# Patient Record
Sex: FEMALE | Race: ASIAN | NOT HISPANIC OR LATINO
[De-identification: names, ages, dates, MRNs, and addresses within clinical notes are randomized per-mention and may not be internally consistent; named-entity substitution may affect disease eponyms.]

---

## 2018-10-29 PROBLEM — Z00.00 ENCOUNTER FOR PREVENTIVE HEALTH EXAMINATION: Status: ACTIVE | Noted: 2018-10-29

## 2018-11-05 ENCOUNTER — APPOINTMENT (OUTPATIENT)
Dept: THORACIC SURGERY | Facility: CLINIC | Age: 78
End: 2018-11-05
Payer: MEDICARE

## 2018-11-05 VITALS
WEIGHT: 141 LBS | OXYGEN SATURATION: 98 % | HEART RATE: 82 BPM | DIASTOLIC BLOOD PRESSURE: 75 MMHG | BODY MASS INDEX: 25.3 KG/M2 | SYSTOLIC BLOOD PRESSURE: 166 MMHG | HEIGHT: 62.5 IN | TEMPERATURE: 97.8 F

## 2018-11-05 DIAGNOSIS — Z86.39 PERSONAL HISTORY OF OTHER ENDOCRINE, NUTRITIONAL AND METABOLIC DISEASE: ICD-10-CM

## 2018-11-05 DIAGNOSIS — Z86.79 PERSONAL HISTORY OF OTHER DISEASES OF THE CIRCULATORY SYSTEM: ICD-10-CM

## 2018-11-05 DIAGNOSIS — J30.2 OTHER SEASONAL ALLERGIC RHINITIS: ICD-10-CM

## 2018-11-05 DIAGNOSIS — Z86.69 PERSONAL HISTORY OF OTHER DISEASES OF THE NERVOUS SYSTEM AND SENSE ORGANS: ICD-10-CM

## 2018-11-05 DIAGNOSIS — K21.9 GASTRO-ESOPHAGEAL REFLUX DISEASE W/OUT ESOPHAGITIS: ICD-10-CM

## 2018-11-05 PROCEDURE — 99205 OFFICE O/P NEW HI 60 MIN: CPT

## 2018-11-07 PROBLEM — J30.2 SEASONAL ALLERGIES: Status: RESOLVED | Noted: 2018-11-07 | Resolved: 2018-11-07

## 2018-11-07 PROBLEM — Z86.79 HISTORY OF CARDIAC MURMUR: Status: RESOLVED | Noted: 2018-11-07 | Resolved: 2018-11-07

## 2018-11-07 PROBLEM — Z86.69 HISTORY OF TRIGEMINAL NEURALGIA: Status: RESOLVED | Noted: 2018-11-07 | Resolved: 2018-11-07

## 2018-11-07 PROBLEM — Z86.39 HISTORY OF HYPERLIPIDEMIA: Status: RESOLVED | Noted: 2018-11-07 | Resolved: 2018-11-07

## 2018-11-07 PROBLEM — K21.9 CHRONIC GERD: Status: RESOLVED | Noted: 2018-11-07 | Resolved: 2018-11-07

## 2018-11-07 RX ORDER — CALCIUM CARBONATE/VITAMIN D3 600MG-5MCG
TABLET ORAL
Refills: 0 | Status: ACTIVE | COMMUNITY

## 2018-12-10 ENCOUNTER — APPOINTMENT (OUTPATIENT)
Dept: THORACIC SURGERY | Facility: CLINIC | Age: 78
End: 2018-12-10
Payer: MEDICARE

## 2018-12-10 VITALS
BODY MASS INDEX: 25.12 KG/M2 | HEART RATE: 82 BPM | WEIGHT: 140 LBS | HEIGHT: 62.5 IN | TEMPERATURE: 98.7 F | OXYGEN SATURATION: 96 % | DIASTOLIC BLOOD PRESSURE: 79 MMHG | SYSTOLIC BLOOD PRESSURE: 145 MMHG

## 2018-12-10 PROCEDURE — 99214 OFFICE O/P EST MOD 30 MIN: CPT

## 2019-04-01 ENCOUNTER — RECORD ABSTRACTING (OUTPATIENT)
Age: 79
End: 2019-04-01

## 2019-04-01 DIAGNOSIS — Z83.6 FAMILY HISTORY OF OTHER DISEASES OF THE RESPIRATORY SYSTEM: ICD-10-CM

## 2019-04-01 DIAGNOSIS — Z83.3 FAMILY HISTORY OF DIABETES MELLITUS: ICD-10-CM

## 2019-09-12 ENCOUNTER — APPOINTMENT (OUTPATIENT)
Dept: RHEUMATOLOGY | Facility: CLINIC | Age: 79
End: 2019-09-12

## 2019-09-27 ENCOUNTER — APPOINTMENT (OUTPATIENT)
Dept: RHEUMATOLOGY | Facility: CLINIC | Age: 79
End: 2019-09-27
Payer: MEDICARE

## 2019-09-27 VITALS
BODY MASS INDEX: 25.38 KG/M2 | DIASTOLIC BLOOD PRESSURE: 70 MMHG | HEART RATE: 67 BPM | WEIGHT: 141 LBS | OXYGEN SATURATION: 98 % | SYSTOLIC BLOOD PRESSURE: 140 MMHG

## 2019-09-27 PROCEDURE — 99214 OFFICE O/P EST MOD 30 MIN: CPT

## 2019-09-27 RX ORDER — OXCARBAZEPINE 150 MG/1
150 TABLET, FILM COATED ORAL
Refills: 0 | Status: DISCONTINUED | COMMUNITY
End: 2019-09-27

## 2019-09-27 RX ORDER — HYDROCODONE BITARTRATE AND HOMATROPINE METHYLBROMIDE 5; 1.5 MG/5ML; MG/5ML
SOLUTION ORAL
Refills: 0 | Status: DISCONTINUED | COMMUNITY
End: 2019-09-27

## 2019-09-27 RX ORDER — ASPIRIN ENTERIC COATED TABLETS 81 MG 81 MG/1
81 TABLET, DELAYED RELEASE ORAL DAILY
Refills: 0 | Status: DISCONTINUED | COMMUNITY
End: 2019-09-27

## 2019-09-27 RX ORDER — HYDROCODONE BITARTRATE AND HOMATROPINE METHYLBROMIDE 5; 1.5 MG/5ML; MG/5ML
SOLUTION ORAL
Refills: 0 | Status: ACTIVE | COMMUNITY

## 2019-09-27 RX ORDER — OMEPRAZOLE MAGNESIUM 20 MG/1
20 TABLET, DELAYED RELEASE ORAL
Refills: 0 | Status: DISCONTINUED | COMMUNITY
End: 2019-09-27

## 2019-09-27 RX ORDER — ACETAMINOPHEN 325 MG/1
TABLET, FILM COATED ORAL
Refills: 0 | Status: DISCONTINUED | COMMUNITY
End: 2019-09-27

## 2019-09-27 RX ORDER — OXCARBAZEPINE 150 MG/1
150 TABLET, FILM COATED ORAL
Refills: 0 | Status: ACTIVE | COMMUNITY

## 2019-09-27 RX ORDER — DESLORATADINE 5 MG/1
5 TABLET, FILM COATED ORAL DAILY
Refills: 0 | Status: DISCONTINUED | COMMUNITY
End: 2019-09-27

## 2019-09-27 NOTE — HISTORY OF PRESENT ILLNESS
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 More recently was switched to pravastatin, but patient reports muscle symptoms have improved but not resolved. She takes meloxicam for relief of muscle symptoms with marginal benefit per patient. There has been no progression of symptoms related to TEREZA positivity. There is no significant AM stiffness (only lasting 5-10 minutes), rash, fever, symptoms of serositis or other symptoms. The only peripheral joint pain she notes if in the first CMC joints bilaterally.  Also since last visit she has been found to have a cystic mediastinal mass, evaluated and followed by Dr. Cottrell.

## 2019-09-27 NOTE — REVIEW OF SYSTEMS
[Joint Pain] : joint pain [Fever] : no fever [Chills] : no chills [Eye Pain] : no eye pain [Shortness Of Breath] : no shortness of breath [Red Eyes] : eyes not red [Cough] : no cough [Abdominal Pain] : no abdominal pain [Diarrhea] : no diarrhea [Joint Swelling] : no joint swelling [Joint Stiffness] : no joint stiffness [Skin Lesions] : no skin lesions [FreeTextEntry6] : No pleuritic C/P

## 2019-09-27 NOTE — PHYSICAL EXAM
[General Appearance - Alert] : alert [General Appearance - In No Acute Distress] : in no acute distress [General Appearance - Well Nourished] : well nourished [General Appearance - Well Developed] : well developed [Sclera] : the sclera and conjunctiva were normal [Cervical Lymph Nodes Enlarged Posterior Bilaterally] : posterior cervical [Auscultation Breath Sounds / Voice Sounds] : lungs were clear to auscultation bilaterally [Cervical Lymph Nodes Enlarged Anterior Bilaterally] : anterior cervical [] : no rash [Motor Exam] : the motor exam was normal [FreeTextEntry1] : Neck flexors and extensors are normal without evidence of weakness.

## 2019-10-01 LAB — CK SERPL-CCNC: 115 U/L

## 2019-10-02 LAB — ALDOLASE SERPL-CCNC: 5.2 U/L

## 2019-10-14 LAB
EJ AB SER QL: NEGATIVE
ENA JO1 AB SER IA-ACNC: <20 UNITS
ENA PM/SCL AB SER-ACNC: <20 UNITS
ENA SM+RNP AB SER IA-ACNC: <20 UNITS
ENA SS-A IGG SER QL: <20 UNITS
FIBRILLARIN AB SER QL: NEGATIVE
KU AB SER QL: NEGATIVE
MDA-5 (P140)(CADM-140): <20 UNITS
MI2 AB SER QL: NEGATIVE
NXP-2 (P140): <20 UNITS
OJ AB SER QL: NEGATIVE
PL12 AB SER QL: NEGATIVE
PL7 AB SER QL: NEGATIVE
SRP AB SERPL QL: NEGATIVE
TIF GAMMA (P155/140): <20 UNITS
U2 SNRNP AB SER QL: NEGATIVE

## 2019-11-05 ENCOUNTER — RESULT REVIEW (OUTPATIENT)
Age: 79
End: 2019-11-05

## 2019-11-06 ENCOUNTER — APPOINTMENT (OUTPATIENT)
Dept: GASTROENTEROLOGY | Facility: HOSPITAL | Age: 79
End: 2019-11-06

## 2020-09-21 ENCOUNTER — APPOINTMENT (OUTPATIENT)
Dept: RHEUMATOLOGY | Facility: CLINIC | Age: 80
End: 2020-09-21
Payer: MEDICARE

## 2020-09-21 VITALS
HEIGHT: 62.5 IN | WEIGHT: 140 LBS | SYSTOLIC BLOOD PRESSURE: 148 MMHG | DIASTOLIC BLOOD PRESSURE: 80 MMHG | BODY MASS INDEX: 25.12 KG/M2

## 2020-09-21 DIAGNOSIS — R76.8 OTHER SPECIFIED ABNORMAL IMMUNOLOGICAL FINDINGS IN SERUM: ICD-10-CM

## 2020-09-21 DIAGNOSIS — Z86.03 PERSONAL HISTORY OF NEOPLASM OF UNCERTAIN BEHAVIOR: ICD-10-CM

## 2020-09-21 DIAGNOSIS — E27.8 OTHER SPECIFIED DISORDERS OF ADRENAL GLAND: ICD-10-CM

## 2020-09-21 DIAGNOSIS — T46.6X5A MYALGIA, UNSPECIFIED SITE: ICD-10-CM

## 2020-09-21 DIAGNOSIS — R70.0 ELEVATED ERYTHROCYTE SEDIMENTATION RATE: ICD-10-CM

## 2020-09-21 DIAGNOSIS — E55.9 VITAMIN D DEFICIENCY, UNSPECIFIED: ICD-10-CM

## 2020-09-21 DIAGNOSIS — M79.10 MYALGIA, UNSPECIFIED SITE: ICD-10-CM

## 2020-09-21 PROCEDURE — 99214 OFFICE O/P EST MOD 30 MIN: CPT

## 2020-09-21 RX ORDER — MONTELUKAST SODIUM 10 MG/1
10 TABLET, FILM COATED ORAL
Refills: 0 | Status: DISCONTINUED | COMMUNITY
End: 2020-09-21

## 2020-09-21 RX ORDER — ALBUTEROL SULFATE 108 UG/1
108 (90 BASE) AEROSOL, METERED RESPIRATORY (INHALATION) EVERY 4 HOURS
Refills: 0 | Status: DISCONTINUED | COMMUNITY
End: 2020-09-21

## 2020-09-21 RX ORDER — HALOBETASOL PROPIONATE 0.5 MG/G
0.05 OINTMENT TOPICAL
Refills: 0 | Status: DISCONTINUED | COMMUNITY
End: 2020-09-21

## 2020-09-21 NOTE — HISTORY OF PRESENT ILLNESS
[FreeTextEntry1] : Patient reports no progression of symptoms since last visit one year ago. No fever, rash or skin photosensitivity, serositis, joint pain or stiffness, or Raynaud's.

## 2021-10-04 ENCOUNTER — APPOINTMENT (OUTPATIENT)
Dept: GASTROENTEROLOGY | Facility: CLINIC | Age: 81
End: 2021-10-04
Payer: MEDICARE

## 2021-10-04 ENCOUNTER — NON-APPOINTMENT (OUTPATIENT)
Age: 81
End: 2021-10-04

## 2021-10-04 VITALS
SYSTOLIC BLOOD PRESSURE: 138 MMHG | DIASTOLIC BLOOD PRESSURE: 82 MMHG | BODY MASS INDEX: 25.48 KG/M2 | TEMPERATURE: 97 F | HEIGHT: 62.5 IN | WEIGHT: 142 LBS | HEART RATE: 72 BPM

## 2021-10-04 PROCEDURE — 99213 OFFICE O/P EST LOW 20 MIN: CPT

## 2021-10-04 RX ORDER — METOPROLOL TARTRATE 25 MG/1
25 TABLET, FILM COATED ORAL
Refills: 0 | Status: ACTIVE | COMMUNITY

## 2021-10-04 RX ORDER — DESLORATADINE 5 MG/1
5 TABLET, FILM COATED ORAL
Refills: 0 | Status: DISCONTINUED | COMMUNITY
End: 2021-10-04

## 2021-10-04 RX ORDER — NAPHAZOLINE HCL/PHENIRAMINE .0268-.315
0.1-0.3 DROPS OPHTHALMIC (EYE)
Refills: 0 | Status: ACTIVE | COMMUNITY

## 2021-10-04 RX ORDER — PRAVASTATIN SODIUM 20 MG/1
20 TABLET ORAL
Refills: 0 | Status: ACTIVE | COMMUNITY

## 2021-10-04 RX ORDER — ELECTROLYTES/DEXTROSE
SOLUTION, ORAL ORAL
Refills: 0 | Status: ACTIVE | COMMUNITY

## 2021-10-04 RX ORDER — CHROMIUM 200 MCG
400 TABLET ORAL
Refills: 0 | Status: ACTIVE | COMMUNITY

## 2021-10-04 RX ORDER — ACETAMINOPHEN 500 MG/1
500 TABLET, COATED ORAL
Refills: 0 | Status: ACTIVE | COMMUNITY

## 2021-10-04 RX ORDER — FAMOTIDINE 20 MG/1
20 TABLET, FILM COATED ORAL
Refills: 0 | Status: ACTIVE | COMMUNITY

## 2021-10-04 RX ORDER — LEVALBUTEROL TARTRATE 45 UG/1
45 AEROSOL, METERED ORAL
Refills: 0 | Status: ACTIVE | COMMUNITY

## 2021-10-04 RX ORDER — SENNOSIDES 8.6 MG
8.6 TABLET ORAL
Refills: 0 | Status: ACTIVE | COMMUNITY

## 2021-10-06 NOTE — HISTORY OF PRESENT ILLNESS
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.\par \par She had a first colonoscopy in 2018, and a normal colonoscopy eight years before that\par \par there was in the cecum, a very flat polyp, encircling the appendiceal orifice, which on biopsy proved to be an infiltrative lesion,  and she had a right hemicolectomy at [facility].\par \par there had also been a sub cm tubular adenoma of the colon.\par \par a fu colonoscopy was performed in November, 2019, and was negative\par \par we tentatively decided on a two year fu colonoscopy\par \par other issues;\par aortic stenosis, followed by Dr Jensen, last seen two months ago, scheduled for fu late March, did have some shortness of breath a few months ago, but it was transient, and Sr does not take any cardiac meds.\par \par the does have abx prophylaxis for dental visits, but not for colonoscopy

## 2021-10-06 NOTE — ASSESSMENT
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.\par \par she is doing well GI wise, and we felt she could safely be done after her upcoming trip, exhibition, in [place] later in the year\par \par has achronic pain condition, chronic right lumbo sacral pain, managed by Dr Perez, and an MRI is scheduled..\par \par had been on several pain meds, including MObic, and then Ibuprofen...has been present with right lumbo sacral pain for seven years\par \par Sister is taking famotidine 20 mg bid for protection of stomach, and also, just recently, has gotten off the antiinflammatories\par \par i emphasized that this is best because of the propensity of NSAIDS to produce gastric ulceration...;  this includes MObic, and this includes Ibuprofen;  and I would advise that if Sister needs to go back on an NSAID< that she use Omeprazole a PPI medication with better cytoprotection of the stomach going forward

## 2022-04-04 ENCOUNTER — APPOINTMENT (OUTPATIENT)
Dept: GASTROENTEROLOGY | Facility: CLINIC | Age: 82
End: 2022-04-04
Payer: MEDICARE

## 2022-04-04 VITALS
HEART RATE: 72 BPM | DIASTOLIC BLOOD PRESSURE: 72 MMHG | SYSTOLIC BLOOD PRESSURE: 128 MMHG | WEIGHT: 141 LBS | TEMPERATURE: 97.5 F | BODY MASS INDEX: 25.3 KG/M2 | HEIGHT: 62.5 IN

## 2022-04-04 DIAGNOSIS — D09.9 CARCINOMA IN SITU, UNSPECIFIED: ICD-10-CM

## 2022-04-04 DIAGNOSIS — K29.60 OTHER GASTRITIS W/OUT BLEEDING: ICD-10-CM

## 2022-04-04 DIAGNOSIS — T39.395A OTHER GASTRITIS W/OUT BLEEDING: ICD-10-CM

## 2022-04-04 PROCEDURE — 99214 OFFICE O/P EST MOD 30 MIN: CPT

## 2022-04-04 RX ORDER — IBUPROFEN 200 MG/1
200 TABLET, COATED ORAL
Refills: 0 | Status: DISCONTINUED | COMMUNITY
End: 2022-04-04

## 2022-04-04 RX ORDER — MELOXICAM 7.5 MG/1
7.5 TABLET ORAL
Refills: 0 | Status: DISCONTINUED | COMMUNITY
End: 2022-04-04

## 2022-04-06 PROBLEM — K29.60 NSAID INDUCED GASTRITIS: Status: ACTIVE | Noted: 2021-10-04

## 2022-04-06 PROBLEM — D09.9: Status: ACTIVE | Noted: 2021-10-04

## 2022-04-06 NOTE — HISTORY OF PRESENT ILLNESS
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 discuss the timing for her next colonoscopy\par \par originally, Sr had a Colonoscopy back in 2018, a relatively innocous polyp which encircled the appendiceal orifice, actually even seemed to originate in the appendiceal oricie\par was noted\par \par piecemeal polypectomy done;  showing adenoca or at least high grade dysplasia in the polyp\par therefore, we decided to perform a right hemicolectomy, which was done...\par \par \par sucessful surgery in 2018\par \par had a normal colonoscopy fu in 2019, NOvember\par \par Sister is in good health\par no cv disease\par no angina, chest pain, pulmonary symptoms, or upper gi symptoms

## 2022-04-06 NOTE — ASSESSMENT
[FreeTextEntry1] : after a long discussion, we have decided that November, 2022 as a three year fu is the best time to do the next colonoscopy\par as the risk of anastamotic recurrent disease, from the oringinal patholgy, is close to zero\par so really we will just be looking for recurrent new polyps\par \par will have SIster come in or perhaps, even a telehealth or telephonic\par the visit will be in OCtober \par \par no indication for upper gi work up as there are no symptoms\par \par there will be no holds prior to the colonoscopy\par \par More than 50% of the face to face time was devoted to counseling and /or coordination of care.  THis coordination of care may have included reviewing other medical notes and reports, and communicating with other health professionals\par

## 2022-10-24 ENCOUNTER — APPOINTMENT (OUTPATIENT)
Dept: GASTROENTEROLOGY | Facility: CLINIC | Age: 82
End: 2022-10-24

## 2022-10-24 VITALS
HEIGHT: 62.5 IN | WEIGHT: 141 LBS | DIASTOLIC BLOOD PRESSURE: 60 MMHG | HEART RATE: 74 BPM | OXYGEN SATURATION: 98 % | SYSTOLIC BLOOD PRESSURE: 120 MMHG | BODY MASS INDEX: 25.3 KG/M2 | TEMPERATURE: 97.5 F

## 2022-10-24 PROCEDURE — 99213 OFFICE O/P EST LOW 20 MIN: CPT

## 2022-10-24 RX ORDER — GABAPENTIN 300 MG/1
300 CAPSULE ORAL
Refills: 0 | Status: DISCONTINUED | COMMUNITY
End: 2022-10-24

## 2022-10-24 RX ORDER — MONTELUKAST SODIUM 10 MG/1
10 TABLET, FILM COATED ORAL
Refills: 0 | Status: DISCONTINUED | COMMUNITY
End: 2022-10-24

## 2022-10-24 RX ORDER — AMOXICILLIN 500 MG/1
500 TABLET, FILM COATED ORAL
Qty: 4 | Refills: 0 | Status: DISCONTINUED | COMMUNITY
End: 2022-10-24

## 2022-10-24 NOTE — ASSESSMENT
[FreeTextEntry1] : 1.  patient has hx of adenomatous polyp, roberth appendiceal with in situ carcinoma, status post right epifanio colectomy\par 2.  this would be a three year follow up for colonoscopy\par \par AS WE OBTAIN INFORMED CONSENT FOR COLONOSCOPY, UPPER ENDOSCOPY [EGD], OR BOTH PROCEDURES TOGETHER;\par \par As with all procedures, there are risks of which the patient should be aware\par \par 1.  Anesthesia; deep sedation with Propofol;  there is a small risk of aspiration and pulmonary infection.  The Anesthesiologist meets with the patient the morning of the procedure to discuss in more detail\par \par 2.  risk of bleeding; from removal of a polyp, or rarely, from biopsies, 1 % or less\par \par 3.  risk of injury or perforation of the colon or upper GI tract; one in a thousand or less,  from removing a polyp or from advancing the instrument\par \par \par will do in mid december\par Dr Perez will perhaps wean off prednisone, but she was always on a low dose of prednisone, less than five mg, so there is no concern re adrenal suppression for colonoscopy\par \par

## 2022-10-24 NOTE — HISTORY OF PRESENT ILLNESS
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\par hx of adenomatous polyp which was roberth appendiceal\par which contained in situ carcinoma\par \par or high grade dysplasia\par because of location..\par we elected to proceed with a right epifanio colectomy\par this was in late 2018\par \par had one fu colonoscopy about nov 2019\par \par and we advised the next colonoscopy be performed now\par \par no symptoms, normal bowel movements..\par \par had a rash in May,\par also low back pain\par has been on Prednisone and Gabapentin\par \par 3 mg of Prednisone has been her steady state dose of Prednisone \par sees Dr Perez\par \par a back injection appeared to help her, last month...\par would like to wean off Prednisone...\par \par \par \par

## 2022-12-19 ENCOUNTER — RESULT REVIEW (OUTPATIENT)
Age: 82
End: 2022-12-19

## 2022-12-20 ENCOUNTER — RESULT REVIEW (OUTPATIENT)
Age: 82
End: 2022-12-20

## 2022-12-21 ENCOUNTER — APPOINTMENT (OUTPATIENT)
Dept: GASTROENTEROLOGY | Facility: HOSPITAL | Age: 82
End: 2022-12-21

## 2022-12-21 ENCOUNTER — TRANSCRIPTION ENCOUNTER (OUTPATIENT)
Age: 82
End: 2022-12-21

## 2023-03-30 ENCOUNTER — APPOINTMENT (OUTPATIENT)
Dept: PULMONOLOGY | Facility: CLINIC | Age: 83
End: 2023-03-30
Payer: MEDICARE

## 2023-03-30 VITALS
HEART RATE: 87 BPM | BODY MASS INDEX: 25.48 KG/M2 | SYSTOLIC BLOOD PRESSURE: 150 MMHG | HEIGHT: 62.5 IN | OXYGEN SATURATION: 99 % | TEMPERATURE: 97.1 F | WEIGHT: 142 LBS | DIASTOLIC BLOOD PRESSURE: 90 MMHG

## 2023-03-30 PROCEDURE — 99204 OFFICE O/P NEW MOD 45 MIN: CPT

## 2023-03-30 NOTE — ASSESSMENT
[FreeTextEntry1] :   I reviewed the CAT scan of the chest.  There are minor micronodules and minor bronchiectasis in the right middle lobe and lingula.  They are unchanged from 2018.  This represents small airway disease possibly atypical mycobacterial infection.  However in view of the stability of the finding on CAT scan and the lack of symptoms of a cough I would not pursue any further diagnosis.  Patient will not be able to raise sputum for AFB testing.  In terms of her mild shortness of breath I would like to obtain complete pulmonary function testing.  Patient should have the results of her PET scan for evaluation of the mediastinal mass.  She should follow-up with thoracic surgery.  However I favor a benign diagnosis such as pericardial or bronchogenic cyst.

## 2023-03-30 NOTE — PHYSICAL EXAM
[No Acute Distress] : no acute distress [Well Nourished] : well nourished [Well Developed] : well developed [Well-Appearing] : well-appearing [Normal Sclera/Conjunctiva] : normal sclera/conjunctiva [PERRL] : pupils equal round and reactive to light [EOMI] : extraocular movements intact [Normal Outer Ear/Nose] : the outer ears and nose were normal in appearance [Normal Oropharynx] : the oropharynx was normal [No JVD] : no jugular venous distention [No Lymphadenopathy] : no lymphadenopathy [Supple] : supple [Thyroid Normal, No Nodules] : the thyroid was normal and there were no nodules present [No Respiratory Distress] : no respiratory distress  [No Accessory Muscle Use] : no accessory muscle use [Clear to Auscultation] : lungs were clear to auscultation bilaterally [Normal Rate] : normal rate  [Regular Rhythm] : with a regular rhythm [Normal S1, S2] : normal S1 and S2 [No Carotid Bruits] : no carotid bruits [No Abdominal Bruit] : a ~M bruit was not heard ~T in the abdomen [No Varicosities] : no varicosities [Pedal Pulses Present] : the pedal pulses are present [No Edema] : there was no peripheral edema [No Palpable Aorta] : no palpable aorta [No Extremity Clubbing/Cyanosis] : no extremity clubbing/cyanosis [Soft] : abdomen soft [Non Tender] : non-tender [Non-distended] : non-distended [No Masses] : no abdominal mass palpated [No HSM] : no HSM [Normal Bowel Sounds] : normal bowel sounds [Normal Posterior Cervical Nodes] : no posterior cervical lymphadenopathy [Normal Anterior Cervical Nodes] : no anterior cervical lymphadenopathy [No Focal Deficits] : no focal deficits [Normal Gait] : normal gait [Normal Affect] : the affect was normal [Normal Insight/Judgement] : insight and judgment were intact [de-identified] : 2/6 yuly at [employer]

## 2023-03-30 NOTE — HISTORY OF PRESENT ILLNESS
[FreeTextEntry1] : Evaluation for abnormal CAT scan of chest and dyspnea. 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  Over the past 1 year she has noted intermittent bouts of shortness of breath.  These can occur walking about 2-3 blocks.  However at times she is able to walk many more blocks without difficulty.  She denies cough or wheezing.  There is no history of TB or TB exposure.  She has a skin test for TB every year.  She is known to have a 2.8 x 2.3 cm anterior mediastinal mass that was seen in 2019.  She also is known to have a heart murmur and is followed by cardiology every 6 months.  A recent CAT scan was done which reveals mild bronchiectasis with mild scattered tree-in-bud nodules in the right middle lobe and lingula.  These nodules are unchanged from 10-.  The mediastinal mass has not changed in size but there is now a rim of calcification.  She has a history of colon surgery 4 years ago for precancerous lesion.  Of note she had a PET scan done 2 days ago but the result is not available to me at this time.

## 2023-04-10 ENCOUNTER — APPOINTMENT (OUTPATIENT)
Dept: THORACIC SURGERY | Facility: CLINIC | Age: 83
End: 2023-04-10
Payer: MEDICARE

## 2023-04-10 VITALS
WEIGHT: 143 LBS | DIASTOLIC BLOOD PRESSURE: 83 MMHG | OXYGEN SATURATION: 97 % | BODY MASS INDEX: 27 KG/M2 | HEIGHT: 61 IN | HEART RATE: 79 BPM | SYSTOLIC BLOOD PRESSURE: 150 MMHG

## 2023-04-10 PROCEDURE — 99213 OFFICE O/P EST LOW 20 MIN: CPT

## 2023-04-10 NOTE — PHYSICAL EXAM
[Sclera] : the sclera and conjunctiva were normal [PERRL With Normal Accommodation] : pupils were equal in size, round, and reactive to light [Neck Appearance] : the appearance of the neck was normal [Neck Cervical Mass (___cm)] : no neck mass was observed [Respiration, Rhythm And Depth] : normal respiratory rhythm and effort [Auscultation Breath Sounds / Voice Sounds] : lungs were clear to auscultation bilaterally [Heart Rate And Rhythm] : heart rate was normal and rhythm regular [Heart Sounds] : normal S1 and S2 [Examination Of The Chest] : the chest was normal in appearance [Abnormal Walk] : normal gait [Nail Clubbing] : no clubbing  or cyanosis of the fingernails [Musculoskeletal - Swelling] : no joint swelling seen [Skin Color & Pigmentation] : normal skin color and pigmentation [] : no rash [Sensation] : the sensory exam was normal to light touch and pinprick [No Focal Deficits] : no focal deficits [Oriented To Time, Place, And Person] : oriented to person, place, and time [Impaired Insight] : insight and judgment were intact [Affect] : the affect was normal

## 2023-04-13 NOTE — HISTORY OF PRESENT ILLNESS
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 Over the past 1 year she has noted intermittent bouts of shortness of breath. These can occur walking about 2-3 blocks. However at times she is able to walk many more blocks without difficulty. She denies cough or wheezing. There is no history of TB or TB exposure. She has a skin test for TB every year. She is known to have a 2.8 x 2.3 cm anterior mediastinal mass that was seen in 2019. She also is known to have a heart murmur and is followed by cardiology every 6 months. A recent CAT scan was done which reveals mild bronchiectasis with mild scattered tree-in-bud nodules in the right middle lobe and lingula. These nodules are unchanged from 10-. The mediastinal mass has not changed in size but there is now a rim of calcification. She was seen by Dr. Plaza and now returns to thoracic surgery office to review PET scan performed 3/28/2023.

## 2023-04-13 NOTE — DATA REVIEWED
[FreeTextEntry1] : Exam Date:  03/28/23					\par Exam: 	PET SKUL-THI ONC FDG INIT					\par Order#:	[id number]					\par \par PET-CT Scan\par \par History: Anterior mediastinal mass. Baseline study to help determine initial treatment strategy. \par \par Procedure: Following at least 4 hour fasting, the patient's blood glucose was 101 mg/dl.  The patient was injected with 9.32 mCi of F-18-FDG. \par \par After resting in a quiet room for about one hour, the patient was positioned on the scanning table and images were obtained using standard PET/CT technique from the mid thigh to the skull base.  \par \par Simultaneous low-dose CT scanning is used for attenuation correction and localization.\par \par PET images were reconstructed in axial, sagittal and coronal planes using CT based attenuation correction.  Images were displayed as PET, CT and fused data sets as well as maximum intensity pixel projections.\par \par The standard uptake values (SUV) reported below are maximum values within the region of interest, expressed in gm/mL.\par \par Comparison: CT chest 3/16/2023 and 6/6/2019\par \par Findings: \par \par Lower head and neck: Normal.\par \par Lungs and large airways: Normal. \par \par Pleura:  No pleural effusion.\par \par Mediastinum and hilar regions: There is mild FDG uptake within the complex mass in the right anterior mediastinum which measures 2.8 x 2.1 cm with SUV 3.1. There is an adjacent 1.2 cm FDG avid nodule with SUV 5 which may represent prevascular lymph node, stable in appearance since 6/6/2019. Mild FDG uptake within bilateral hilar lymph nodes likely inflammatory.\par \par Heart and pericardium:  Heart size is normal. No pericardial effusion.\par \par Vessels:  Mild atherosclerotic disease\par \par Liver:  No pathologic FDG uptake.\par \par Gallbladder: No radiopaque stones gallbladder.\par \par Spleen:  No pathologic FDG uptake.\par \par Pancreas:  No pathologic FDG uptake.\par \par Adrenal glands:  No pathologic FDG uptake.\par \par Kidneys: Right renal cyst is noted.\par \par Abdominal and pelvic adenopathy:  No lymphadenopathy in abdomen or pelvis.\par \par Ascites: None.\par \par Gastrointestinal tract: Physiologic FDG uptake is noted.\par \par Pelvic organs: No pathologic FDG uptake\par \par Soft tissues: Normal.\par \par Bones: There is focal FDG uptake within the right L5-S1 facet joints and pedicle with prominent osteophytes secondary to degenerative changes..\par \par \par \par Impression: \par \par Mild FDG uptake to 0.8 cm right anterior mediastinal mass corresponding to findings on recent CT chest which may represent benign or malignant etiology.\par \par Mild FDG uptake within bilateral hilar lymph nodes and mediastinal lymph nodes which may be reactive and overall similar in appearance dating back to 6/6/2019.\par \par No additional sites of pathologic FDG uptake to suggest biologic tumor activity.\par \par --- End of Report ---\par \par ***Electronically Signed ***\par \par ---------------------------------------------------------------------------------------\par \par Exam Date  03/16/23					\par Exam: 	CT CHEST					\par Order#:	[id number]					\par             \par \par \par CLINICAL INFORMATION: Dyspnea. Pulmonary nodules. Rule out mass. Personal history of sigmoid cancer.\par \par COMPARISON: CT chest 6/6/2019, CT chest 10/10/2018\par \par CONTRAST/COMPLICATIONS:\par IV Contrast: NONE\par Oral Contrast: NONE\par Complications: None reported at time of study completion \par \par PROCEDURE: \par CT of the Chest was performed.\par Sagittal and coronal reformats were performed.\par \par FINDINGS:\par Assessment of the mediastinum, vessels and upper abdominal organs is limited due to lack of IV contrast.\par \par LUNGS AND AIRWAYS: Patent central airways. Traction bronchiectasis, bronchiolar wall thickening and numerous tree-in-bud nodules with endobronchial nodules in the lingula and right middle lobe compatible with small airway disease suspicious for MARLON infection. Subpleural reticulations and tree-in-bud nodules in both lower lobes left worse than right. Mild bilateral lower lobe bronchiectasis also seen. Cluster of tree-in-bud nodules in both upper lobes also noted in addition to random up to 4 mm bilateral lung nodules. Mild centrilobular emphysema. Irregular biapical pleural-parenchymal scarring. Lungs are clear.\par PLEURA: No pleural effusion.\par MEDIASTINUM AND RAJWINDER: 2.8 x 2.3 cm right anterior intermediate density mediastinal mass is not changed significantly changed, however it has developed coarse rim calcification suggesting that it is a lesion of complex cystic internal contents. There are 3 adjacent subcentimeter anterior mediastinal nodules. Stable 1 cm precarinal an 1 cm prevascular space lymph nodes. \par VESSELS: Within normal limits.\par HEART: Heart size is normal. No pericardial effusion. Aortic valve calcification. Coronary artery and mild aortic arch branch calcifications.\par CHEST WALL AND LOWER NECK: Punctate microcalcifications.\par VISUALIZED UPPER ABDOMEN: Small hiatal hernia. Gastric cardia wall thickening versus underdistention.\par BONES: Multilevel degenerative changes of the spine.\par \par \par IMPRESSION: \par 2.8 x 2.3 cm intermediate density anterior mediastinal mass is not changed significantly changed in size from the most recent prior study, however it has developed coarse rim calcification suggesting that it is a lesion of complex cystic internal contents. Differential diagnosis includes thymic cyst versus cystic thymoma versus necrotic or mucinous lymphadenopathy/metastasis. PET/CT and surgical consultation is recommended.\par \par Traction bronchiectasis and tree-in-bud nodules bilaterally more prominent in the right middle lobe and lingula unchanged from 10/10/2018. Findings are suspicious for mycobacterial infection such as MARLON infection, differential diagnosis includes fungal infection such as allergic bronchopulmonary aspergillosis. Recommend clinical and laboratory correlation.\par \par Additional findings noted above.\par \par --- End of Report ---\par \par ***Electronically Signed ***

## 2023-04-13 NOTE — ASSESSMENT
[FreeTextEntry1] : This patient is 82-year-old female with a stable mediastinal mass that is unchanged.  I recommend a repeat CAT scan in 1 year.  The patient will see me after her CAT scan.  If she has any symptoms she will give my office a call.

## 2023-06-09 ENCOUNTER — APPOINTMENT (OUTPATIENT)
Dept: PULMONOLOGY | Facility: CLINIC | Age: 83
End: 2023-06-09
Payer: MEDICARE

## 2023-06-09 VITALS
SYSTOLIC BLOOD PRESSURE: 148 MMHG | WEIGHT: 143 LBS | HEIGHT: 61 IN | OXYGEN SATURATION: 96 % | BODY MASS INDEX: 27 KG/M2 | DIASTOLIC BLOOD PRESSURE: 80 MMHG | HEART RATE: 78 BPM

## 2023-06-09 PROCEDURE — 99214 OFFICE O/P EST MOD 30 MIN: CPT

## 2023-06-09 RX ORDER — UMECLIDINIUM BROMIDE AND VILANTEROL TRIFENATATE 62.5; 25 UG/1; UG/1
62.5-25 POWDER RESPIRATORY (INHALATION)
Qty: 2 | Refills: 3 | Status: ACTIVE | COMMUNITY
Start: 2023-06-09 | End: 1900-01-01

## 2023-06-09 NOTE — HISTORY OF PRESENT ILLNESS
[FreeTextEntry1] : Follow-up dyspnea on exertion. [de-identified] :   Patient continues to complain of shortness of breath if she walks and talks at the same time or if she carries a heavier object.  There are no fever, chills or weight loss.  She had a PFT done in April which reveals normal spirometry and lung volumes with a diffusion capacity of 70% of predicted.  She saw infectious disease and had sputum studies done which were negative for AFB.  She also has back pain.  She states she occasionally wheezes at night.  She very rarely coughs.  She is followed by  cardiology every 6 months.  Her last echo I believe was around March.  She now asked for a bronchodilator for the wheezing and shortness of breath.

## 2023-06-09 NOTE — ASSESSMENT
[FreeTextEntry1] :   The etiology of dyspnea is somewhat unclear.  PFTs reveal normal spirometry and lung volumes.  Diffusion is mildly reduced.  Patient has very minimal tree-in-bud nodules in the right middle lobe and lingula.  I doubt that an atypical mycobacterial infection can explain the whole clinical picture.  I will try patient on Anoro.  She is to return in 6 weeks.

## 2023-07-21 ENCOUNTER — APPOINTMENT (OUTPATIENT)
Dept: PULMONOLOGY | Facility: CLINIC | Age: 83
End: 2023-07-21
Payer: MEDICARE

## 2023-07-21 VITALS
OXYGEN SATURATION: 97 % | WEIGHT: 143 LBS | HEART RATE: 76 BPM | SYSTOLIC BLOOD PRESSURE: 130 MMHG | DIASTOLIC BLOOD PRESSURE: 80 MMHG | BODY MASS INDEX: 27 KG/M2 | HEIGHT: 61 IN

## 2023-07-21 DIAGNOSIS — R06.09 OTHER FORMS OF DYSPNEA: ICD-10-CM

## 2023-07-21 PROCEDURE — 99214 OFFICE O/P EST MOD 30 MIN: CPT

## 2023-07-21 NOTE — ASSESSMENT
[FreeTextEntry1] :   Patient seems to be doing better on Anoro.  She is not wheezing and dyspnea is less.  I  doubt this patient has atypical mycobacterial infection as she has fairly minimal tree-in-bud nodules in the right middle lobe and lingula.  A sputum sample is pending.  She does have a 2.4 cm anterior mediastinal mass which is being monitored by thoracic surgery.  At this time I would like to repeat a CAT scan of the chest in September which will represent a 6-month follow-up.  We will look at both the mediastinal mass and the tree-in-bud nodules.  She is to follow-up with me and see me in October.

## 2023-07-21 NOTE — HISTORY OF PRESENT ILLNESS
[FreeTextEntry1] : Evaluation for shortness of breath, abnormal CAT scan of the chest [de-identified] :  patient is doing much better since I last saw her.  She was placed on Anoro.  She is no longer wheezing.  Her shortness of breath seems less.  She does get slightly short of breath walking 300 feet up the ramp.  However she also has back pain.  She has a minimal cough.  Her appetite is good and weight is stable.  She is known to have mild tree-in-bud nodules in the right middle lobe and lingula.  She recently had a sputum sample sent for AFB.  The results are pending.  She again generally feels better with the use of Anoro.  She continues to work and care for her garden.

## 2023-07-21 NOTE — PHYSICAL EXAM
[No Acute Distress] : no acute distress [Well Nourished] : well nourished [Well Developed] : well developed [Well-Appearing] : well-appearing [Normal Sclera/Conjunctiva] : normal sclera/conjunctiva [PERRL] : pupils equal round and reactive to light [EOMI] : extraocular movements intact [Normal Outer Ear/Nose] : the outer ears and nose were normal in appearance [Normal Oropharynx] : the oropharynx was normal [No JVD] : no jugular venous distention [No Lymphadenopathy] : no lymphadenopathy [Supple] : supple [Thyroid Normal, No Nodules] : the thyroid was normal and there were no nodules present [No Respiratory Distress] : no respiratory distress  [No Accessory Muscle Use] : no accessory muscle use [Clear to Auscultation] : lungs were clear to auscultation bilaterally [Normal Rate] : normal rate  [Regular Rhythm] : with a regular rhythm [Normal S1, S2] : normal S1 and S2 [No Carotid Bruits] : no carotid bruits [No Abdominal Bruit] : a ~M bruit was not heard ~T in the abdomen [No Varicosities] : no varicosities [Pedal Pulses Present] : the pedal pulses are present [No Edema] : there was no peripheral edema [No Palpable Aorta] : no palpable aorta [No Extremity Clubbing/Cyanosis] : no extremity clubbing/cyanosis [Soft] : abdomen soft [Non Tender] : non-tender [Non-distended] : non-distended [No Masses] : no abdominal mass palpated [No HSM] : no HSM [Normal Bowel Sounds] : normal bowel sounds [Normal Posterior Cervical Nodes] : no posterior cervical lymphadenopathy [Normal Anterior Cervical Nodes] : no anterior cervical lymphadenopathy [No Focal Deficits] : no focal deficits [Normal Gait] : normal gait [Normal Affect] : the affect was normal [Normal Insight/Judgement] : insight and judgment were intact [de-identified] : 2/6 yuly at [employer]

## 2023-09-08 ENCOUNTER — RESULT REVIEW (OUTPATIENT)
Age: 83
End: 2023-09-08

## 2023-10-23 ENCOUNTER — APPOINTMENT (OUTPATIENT)
Dept: PULMONOLOGY | Facility: CLINIC | Age: 83
End: 2023-10-23
Payer: MEDICARE

## 2023-10-23 VITALS
WEIGHT: 143 LBS | BODY MASS INDEX: 27 KG/M2 | OXYGEN SATURATION: 98 % | SYSTOLIC BLOOD PRESSURE: 150 MMHG | HEART RATE: 77 BPM | HEIGHT: 61 IN | DIASTOLIC BLOOD PRESSURE: 85 MMHG

## 2023-10-23 PROCEDURE — 99214 OFFICE O/P EST MOD 30 MIN: CPT

## 2024-04-08 ENCOUNTER — APPOINTMENT (OUTPATIENT)
Dept: PULMONOLOGY | Facility: CLINIC | Age: 84
End: 2024-04-08
Payer: MEDICARE

## 2024-04-08 VITALS
HEIGHT: 61 IN | OXYGEN SATURATION: 98 % | BODY MASS INDEX: 27.19 KG/M2 | SYSTOLIC BLOOD PRESSURE: 160 MMHG | HEART RATE: 102 BPM | WEIGHT: 144 LBS | DIASTOLIC BLOOD PRESSURE: 80 MMHG

## 2024-04-08 DIAGNOSIS — R91.8 OTHER NONSPECIFIC ABNORMAL FINDING OF LUNG FIELD: ICD-10-CM

## 2024-04-08 DIAGNOSIS — J98.59 OTHER DISEASES OF MEDIASTINUM, NOT ELSEWHERE CLASSIFIED: ICD-10-CM

## 2024-04-08 PROCEDURE — 99214 OFFICE O/P EST MOD 30 MIN: CPT

## 2024-04-08 NOTE — HISTORY OF PRESENT ILLNESS
[TextBox_4] : Patient has been doing well over the past 6 months without shortness of breath.  She has a rare dry cough over the past 10 days.  She did have sputum for AFB send I believe x 2.  Results are pending.  Her repeat CAT scan was reviewed by me from March 19.  This reveals no change in minor bronchiectasis and tree-in-bud nodules in the lingula and right middle lobe.  There is a 2.8 x 2.4 cm anterior mediastinal mass unchanged over the past 6 months.  Patient again generally is feeling well essentially asymptomatic.  There is no fever, chills, weight loss or anorexia.  She generally is doing well.

## 2024-04-08 NOTE — REASON FOR VISIT
[Follow-Up] : a follow-up visit [Bronchiectasis] : bronchiectasis [Pulmonary Nodules] : pulmonary nodules

## 2024-04-08 NOTE — ASSESSMENT
[FreeTextEntry1] : Patient with minor bronchiectasis and tree-in-bud nodules in the right middle lobe and lingula.  However patient remains essentially asymptomatic.  Sputum for AFB has been sent a week ago.  I would wait on results.  At this time I feel there is no further indication for treatment for possible MAC infection.  Patient is to return and see me in 6 months and we will try and to check on the results of the sputum.  She also should follow-up with thoracic surgery to make a 1 year follow-up re mediastinal mass.

## 2024-04-29 ENCOUNTER — APPOINTMENT (OUTPATIENT)
Dept: THORACIC SURGERY | Facility: CLINIC | Age: 84
End: 2024-04-29

## 2024-05-06 ENCOUNTER — APPOINTMENT (OUTPATIENT)
Dept: THORACIC SURGERY | Facility: CLINIC | Age: 84
End: 2024-05-06
Payer: MEDICARE

## 2024-05-06 VITALS
HEART RATE: 70 BPM | RESPIRATION RATE: 16 BRPM | BODY MASS INDEX: 27 KG/M2 | WEIGHT: 143 LBS | SYSTOLIC BLOOD PRESSURE: 131 MMHG | HEIGHT: 61 IN | OXYGEN SATURATION: 95 % | DIASTOLIC BLOOD PRESSURE: 73 MMHG

## 2024-05-06 PROCEDURE — 99213 OFFICE O/P EST LOW 20 MIN: CPT

## 2024-05-10 NOTE — DATA REVIEWED
[FreeTextEntry1] : Exam Date: 	  03/13/24					 Exam: 	CT CHEST					 Order#:	[id number]					              CLINICAL INFORMATION: Anterior mediastinal mass and bronchiectasis.   COMPARISON: CT chest 9/8/2023. PET/CT 3/28/2023.  CONTRAST/COMPLICATIONS: IV Contrast: NONE Oral Contrast: NONE Complications: None reported at time of study completion  PROCEDURE:  CT of the Chest was performed. Sagittal and coronal reformats were performed.  FINDINGS:  LUNGS AND AIRWAYS: Patent central airways. Bilateral cylindrical bronchiectasis, in addition to varicose bronchiectasis in the right middle lobe and lingula. Endobronchial plugging in the right middle lobe and lingula, in addition to tree-in-bud nodules and subsegmental atelectasis compatible with chronic MARLON infection. 6 mm lingular pulmonary nodule is unchanged when remeasured, image [id number]. In addition, there are unchanged scattered bilateral pulmonary micronodules measuring up to 4 mm. Irregular biapical pleuroparenchymal scarring again noted. 0.6 cm air-filled cyst in the right upper lobe. PLEURA: No pleural effusion. MEDIASTINUM AND RAJWINDER: 2.8 x 2.4 cm low-density ovoid right anterior mediastinal mass with peripheral rim calcium is unchanged. There are small adjacent anterior mediastinal lymph nodes measuring up to 6 mm in short axis. 0.9 cm precarinal lymph node, 0.9 cm prevascular space lymph nodes are unchanged. VESSELS: Mild atherosclerotic calcification of the thoracic aorta, aortic arch branches. HEART: Heart size is normal. No pericardial effusion. Aortic valve calcification. CHEST WALL AND LOWER NECK: Bilateral calcified thyroid nodules. VISUALIZED UPPER ABDOMEN: Tiny hiatal hernia. BONES: Multilevel degenerative changes throughout the spine, sternal manubrial joint.   IMPRESSION:  Stable appearance of the chest.  2.8 cm low-density lesion with rim calcium in the anterior mediastinum and adjacent small anterior mediastinal lymph nodes.   Bronchiectasis.  Other findings suspicious for lingular and right middle lobe chronic MARLON infection, unchanged. Less likely, differential diagnosis includes chronic fungal infection. Clinical correlation and follow-up is recommended.  Scattered bilateral up to 6 mm lung nodules, unchanged.  Recommend pulmonary consultation. In addition, continued follow-up CT to assess stability of the above findings is recommended.  Additional findings noted above.  --- End of Report ---  ***Electronically Signed ***

## 2024-05-10 NOTE — HISTORY OF PRESENT ILLNESS
[FreeTextEntry1] : 83 year old F Brayan sister patient of Dr. Plaza who is known to have a 2.8 x 2.3 cm anterior mediastinal mass that was seen in 2019. She also is known to have a heart murmur and is followed by cardiology every 6 months. She was seen by thoracic surgery in April 2023. Her mediastinal mass was stable and was recommended to follow up with a repeat CT chest in 1 year. She now presents to review most recent CT chest 3/19/2024.  CT chest as follows: IMPRESSION:  Stable appearance of the chest. 2.8 cm low-density lesion with rim calcium in the anterior mediastinum and adjacent small anterior mediastinal lymph nodes.  Bronchiectasis. Other findings suspicious for lingular and right middle lobe chronic MARLON infection, unchanged. Less likely, differential diagnosis includes chronic fungal infection. Clinical correlation and follow-up is recommended. Scattered bilateral up to 6 mm lung nodules, unchanged. Recommend pulmonary consultation. In addition, continued follow-up CT to assess stability of the above findings is recommended.  She had sputum AFB sent 3/26.                                   AFB SMEARS AND CULTURES                                            ***PRELIMINARY REPORT***         Reported Date/Time: 4/17/2024 23:04 EDT         No acid-fast bacilli isolated at 3 weeks.         ***STAIN REPORT***         Acid Fast Stain  []         Reported Date/Time: 3/26/2024 21:11 EDT         No acid-fast bacilli seen by fluorochrome stain  Patient presents today feeling well. She endorses a chronic dry cough and that she has been waiting for her sputum results to come back. She denies any fever, chills, SOB, chest discomfort at this time.

## 2024-05-10 NOTE — PHYSICAL EXAM
[Sclera] : the sclera and conjunctiva were normal [Neck Appearance] : the appearance of the neck was normal [Heart Rate And Rhythm] : heart rate was normal and rhythm regular [Heart Sounds] : normal S1 and S2 [Examination Of The Chest] : the chest was normal in appearance [Skin Color & Pigmentation] : normal skin color and pigmentation [] : no rash [Sensation] : the sensory exam was normal to light touch and pinprick [No Focal Deficits] : no focal deficits [Oriented To Time, Place, And Person] : oriented to person, place, and time [Impaired Insight] : insight and judgment were intact [Affect] : the affect was normal [FreeTextEntry1] : walks with a cane

## 2025-02-27 ENCOUNTER — APPOINTMENT (OUTPATIENT)
Dept: GASTROENTEROLOGY | Facility: CLINIC | Age: 85
End: 2025-02-27
Payer: MEDICARE

## 2025-02-27 VITALS — OXYGEN SATURATION: 97 % | SYSTOLIC BLOOD PRESSURE: 168 MMHG | DIASTOLIC BLOOD PRESSURE: 96 MMHG | HEART RATE: 76 BPM

## 2025-02-27 VITALS
BODY MASS INDEX: 27.56 KG/M2 | HEART RATE: 80 BPM | OXYGEN SATURATION: 98 % | HEIGHT: 61 IN | WEIGHT: 146 LBS | SYSTOLIC BLOOD PRESSURE: 184 MMHG | DIASTOLIC BLOOD PRESSURE: 98 MMHG

## 2025-02-27 DIAGNOSIS — K86.2 CYST OF PANCREAS: ICD-10-CM

## 2025-02-27 DIAGNOSIS — K62.5 HEMORRHAGE OF ANUS AND RECTUM: ICD-10-CM

## 2025-02-27 PROCEDURE — 99214 OFFICE O/P EST MOD 30 MIN: CPT

## 2025-02-27 RX ORDER — ROSUVASTATIN CALCIUM 5 MG/1
5 TABLET, FILM COATED ORAL
Refills: 0 | Status: ACTIVE | COMMUNITY

## 2025-02-27 RX ORDER — LEVALBUTEROL 1.25 MG/.5ML
SOLUTION, CONCENTRATE RESPIRATORY (INHALATION)
Refills: 0 | Status: ACTIVE | COMMUNITY

## 2025-02-27 RX ORDER — MONTELUKAST SODIUM 10 MG/1
10 TABLET, FILM COATED ORAL
Refills: 0 | Status: ACTIVE | COMMUNITY

## 2025-02-27 RX ORDER — SENNOSIDES 8.6 MG/1
8.6 CAPSULE, GELATIN COATED ORAL
Refills: 0 | Status: ACTIVE | COMMUNITY

## 2025-04-02 ENCOUNTER — RESULT REVIEW (OUTPATIENT)
Age: 85
End: 2025-04-02

## 2025-05-13 ENCOUNTER — APPOINTMENT (OUTPATIENT)
Dept: THORACIC SURGERY | Facility: CLINIC | Age: 85
End: 2025-05-13
Payer: MEDICARE

## 2025-05-13 PROCEDURE — 99213 OFFICE O/P EST LOW 20 MIN: CPT
